# Patient Record
Sex: FEMALE | Race: WHITE | Employment: FULL TIME | URBAN - METROPOLITAN AREA
[De-identification: names, ages, dates, MRNs, and addresses within clinical notes are randomized per-mention and may not be internally consistent; named-entity substitution may affect disease eponyms.]

---

## 2021-02-09 ENCOUNTER — OFFICE VISIT (OUTPATIENT)
Dept: URGENT CARE | Facility: PHYSICIAN GROUP | Age: 66
End: 2021-02-09
Payer: MEDICARE

## 2021-02-09 VITALS
WEIGHT: 134 LBS | TEMPERATURE: 98 F | HEIGHT: 60 IN | BODY MASS INDEX: 26.31 KG/M2 | SYSTOLIC BLOOD PRESSURE: 144 MMHG | DIASTOLIC BLOOD PRESSURE: 80 MMHG | HEART RATE: 75 BPM | RESPIRATION RATE: 14 BRPM | OXYGEN SATURATION: 96 %

## 2021-02-09 DIAGNOSIS — L30.9 PERIORBITAL DERMATITIS: ICD-10-CM

## 2021-02-09 PROCEDURE — 99214 OFFICE O/P EST MOD 30 MIN: CPT | Performed by: NURSE PRACTITIONER

## 2021-02-09 RX ORDER — IBUPROFEN 800 MG/1
800 TABLET ORAL EVERY 8 HOURS PRN
COMMUNITY

## 2021-02-09 RX ORDER — METHOCARBAMOL 500 MG/1
1000 TABLET, FILM COATED ORAL 4 TIMES DAILY
COMMUNITY

## 2021-02-09 RX ORDER — METHYLPREDNISOLONE 4 MG/1
TABLET ORAL
Qty: 21 TAB | Refills: 0 | Status: SHIPPED | OUTPATIENT
Start: 2021-02-09

## 2021-02-09 RX ORDER — AMITRIPTYLINE HYDROCHLORIDE 25 MG/1
25 TABLET, FILM COATED ORAL NIGHTLY
COMMUNITY

## 2021-02-09 NOTE — LETTER
February 9, 2021       Patient: Angelina Meléndez   YOB: 1955   Date of Visit: 2/9/2021         To Whom It May Concern:    In my medical opinion, I recommend that Angelina Meléndez {Work release (duty restriction):49270}    If you have any questions or concerns, please don't hesitate to call [phone]          Sincerely,          Opal Matson, A.P.N.  Electronically Signed

## 2021-02-10 ASSESSMENT — ENCOUNTER SYMPTOMS
MYALGIAS: 0
PHOTOPHOBIA: 0
FEVER: 0
EYE REDNESS: 0
SORE THROAT: 0
VOMITING: 0
NAUSEA: 0
COUGH: 0
HEADACHES: 0
SPUTUM PRODUCTION: 0
BLURRED VISION: 0
EYE PAIN: 0
DOUBLE VISION: 0
EYE DISCHARGE: 0

## 2021-02-10 ASSESSMENT — LIFESTYLE VARIABLES: SUBSTANCE_ABUSE: 0

## 2021-02-10 NOTE — PROGRESS NOTES
"Angelina Meléndez is a 65 y.o. female who presents for Eye Problem (sinus issues, pressure, red puffy eyes itchiness, drainage,  )      HPI this new problem.  Angelina is a 65-year-old female presents with eye itchiness facial redness that started 1 week ago.  She believes she is having allergic reaction to \"something\".  The redness all started with itchy eyes.  She rubs them.  The skin on her face is red and very dry. It feels 'tight' to her.  She has had mild drainage in her eyes in the morning but nothing during the day.  She is itching on her face all the time.  Initially this started after she was working out in her yard.  No other aggravating alleviating factors.  She has taken over-the-counter Benadryl without significant relief in her symptoms.  She denies shortness of breath or difficulty breathing.  Her vision is reported as normal. Treatment tried: Cocoa butter cream and Vaseline BID to TID for 5-7 days now. \" that's what I always use\".     Review of Systems   Constitutional: Negative for fever and malaise/fatigue.   HENT: Negative for congestion and sore throat.    Eyes: Negative for blurred vision, double vision, photophobia, pain, discharge and redness.   Respiratory: Negative for cough and sputum production.    Cardiovascular: Negative for chest pain.   Gastrointestinal: Negative for nausea and vomiting.   Musculoskeletal: Negative for myalgias.   Skin: Positive for itching and rash.   Neurological: Negative for headaches.   Endo/Heme/Allergies: Negative for environmental allergies.   Psychiatric/Behavioral: Negative for substance abuse.       Allergies   Allergen Reactions   • Codeine      Sweats and crazy dreams     Past Medical History:   Diagnosis Date   • LBP (low back pain)      History reviewed. No pertinent surgical history.  Family History   Problem Relation Age of Onset   • Hypertension Father    • Arthritis Father         gout     Social History     Tobacco Use   • Smoking status: " Current Every Day Smoker   • Smokeless tobacco: Never Used   • Tobacco comment: quit last wks   Substance Use Topics   • Alcohol use: No     Patient Active Problem List   Diagnosis   • Low back pain     Current Outpatient Medications on File Prior to Visit   Medication Sig Dispense Refill   • methocarbamol (ROBAXIN) 500 MG Tab Take 1,000 mg by mouth 4 times a day.     • PANTOPRAZOLE SODIUM PO Take  by mouth.     • amitriptyline (ELAVIL) 25 MG Tab Take 25 mg by mouth every evening.     • ibuprofen (MOTRIN) 800 MG Tab Take 800 mg by mouth every 8 hours as needed.     • BEE POLLEN PO Take  by mouth.     • GLUCOSAMINE CHONDROITIN COMPLX PO Take  by mouth.     • VITAMIN C, CALCIUM ASCORBATE, PO Take  by mouth.     • VITAMIN D PO Take  by mouth.     • Multiple Vitamin (MULTIVITAMIN PO) Take  by mouth.     • meloxicam (MOBIC) 15 MG tablet Take 1 Tab by mouth every day. (Patient not taking: Reported on 2/9/2021) 30 Tab 0   • busPIRone (BUSPAR) 15 MG tablet Take 0.5 Tabs by mouth 2 times a day. (Patient not taking: Reported on 2/9/2021) 30 Tab 1     No current facility-administered medications on file prior to visit.          Objective:     /80   Pulse 75   Temp 36.7 °C (98 °F)   Resp 14   Ht 1.524 m (5')   Wt 60.8 kg (134 lb)   SpO2 96%   BMI 26.17 kg/m²     Physical Exam  Vitals and nursing note reviewed.   Constitutional:       General: She is not in acute distress.     Appearance: Normal appearance. She is normal weight. She is not toxic-appearing.   HENT:      Head: Normocephalic.        Mouth/Throat:      Lips: Pink.      Mouth: Mucous membranes are moist.      Pharynx: Oropharynx is clear. Uvula midline. No uvula swelling.   Cardiovascular:      Rate and Rhythm: Normal rate and regular rhythm.      Pulses: Normal pulses.      Heart sounds: Normal heart sounds.   Pulmonary:      Effort: Pulmonary effort is normal.      Breath sounds: Normal breath sounds.   Musculoskeletal:      Cervical back: Full passive  range of motion without pain, normal range of motion and neck supple.   Skin:     General: Skin is warm.      Capillary Refill: Capillary refill takes less than 2 seconds.      Findings: Erythema present.   Neurological:      Mental Status: She is alert and oriented to person, place, and time.   Psychiatric:         Mood and Affect: Mood normal.         Behavior: Behavior normal.         Thought Content: Thought content normal.           Assessment /Associated Orders:      1. Periorbital dermatitis  methylPREDNISolone (MEDROL DOSEPAK) 4 MG Tablet Therapy Pack       Medical Decision Making:      VSS at today's acute urgent care visit.   Educated in proper administration of medication(s) ordered today including safety, possible SE, risks, benefits, rationale and alternatives to therapy.   OTC antihistamine of choice. Follow manufactures dosing and safety guidelines.   Keep well hydrated  Do not scratch or rub at area.   Stop using cocoa butter or Vaseline on face. Use a cream that is made for the eye area.         Discussed management options (risks,benefits, and alternatives to treatment). Pt expresses understanding and the treatment plan was agreed upon. Questions were encouraged and answered to pt's satisfaction.   Advised to follow-up with the primary care physician for recheck, reevaluation, and consideration of further management if necessary.   Return to urgent care prn if new or worsening sx or if there is no improvement in condition prn. Red flags discussed and indications to immediately call 911 or present to the Emergency Department.     I personally reviewed prior external notes and test results pertinent to today's visit.  I have independently reviewed and interpreted all diagnostics ordered during this urgent care acute visit.   Time spent evaluating this patient was at least 30 minutes and includes preparing for visit, counseling/education, exam and evaluation, obtaining history, independent  interpretation and ordering lab/test/procedures/medication management. This does not include time spent on separately billable procedures/tests.      Please note that this dictation was created using voice recognition software. I have made a reasonable attempt to correct obvious errors, but I expect that there are errors of grammar and possibly content that I did not discover before finalizing the note.    This note was electronically signed by Opal CHANCE, Urgent Care

## 2021-02-10 NOTE — PATIENT INSTRUCTIONS
Over the counter antihistamine such as CLARITIN ( loratadine 10 mg)  Take everyday until you are feeling better    Start the prescription steriod tomorrow morning    Take the diphenhydramine ( that you already have ) at night before bedtime if you are itching badly     Use an eye cream ( not Vaseline or Coconut butter) around eyes. It should be specifically made for eyes.           Rash, Adult  A rash is a change in the color of your skin. A rash can also change the way your skin feels. There are many different conditions and factors that can cause a rash. Some rashes may disappear after a few days, but some may last for a few weeks. Common causes of rashes include:  · Viral infections, such as:  ? Colds.  ? Measles.  ? Hand, foot, and mouth disease.  · Bacterial infections, such as:  ? Scarlet fever.  ? Impetigo.  · Fungal infections, such as Candida.  · Allergic reactions to food, medicines, or skin care products.  Follow these instructions at home:  The goal of treatment is to stop the itching and keep the rash from spreading. Pay attention to any changes in your symptoms. Follow these instructions to help with your condition:  Medicine  Take or apply over-the-counter and prescription medicines only as told by your health care provider. These may include:  · Corticosteroid creams to treat red or swollen skin.  · Anti-itch lotions.  · Oral allergy medicines (antihistamines).  · Oral corticosteroids for severe symptoms.    Skin care  · Apply cool compresses to the affected areas.  · Do not scratch or rub your skin.  · Avoid covering the rash. Make sure the rash is exposed to air as much as possible.  Managing itching and discomfort  · Avoid hot showers or baths, which can make itching worse. A cold shower may help.  · Try taking a bath with:  ? Epsom salts. Follow  instructions on the packaging. You can get these at your local pharmacy or grocery store.  ? Baking soda. Pour a small amount into the bath  as told by your health care provider.  ? Colloidal oatmeal. Follow  instructions on the packaging. You can get this at your local pharmacy or grocery store.  · Try applying baking soda paste to your skin. Stir water into baking soda until it reaches a paste-like consistency.  · Try applying calamine lotion. This is an over-the-counter lotion that helps to relieve itchiness.  · Keep cool and out of the sun. Sweating and being hot can make itching worse.  General instructions    · Rest as needed.  · Drink enough fluid to keep your urine pale yellow.  · Wear loose-fitting clothing.  · Avoid scented soaps, detergents, and perfumes. Use gentle soaps, detergents, perfumes, and other cosmetic products.  · Avoid any substance that causes your rash. Keep a journal to help track what causes your rash. Write down:  ? What you eat.  ? What cosmetic products you use.  ? What you drink.  ? What you wear. This includes jewelry.  · Keep all follow-up visits as told by your health care provider. This is important.  Contact a health care provider if:  · You sweat at night.  · You lose weight.  · You urinate more than normal.  · You urinate less than normal, or you notice that your urine is a darker color than usual.  · You feel weak.  · You vomit.  · Your skin or the whites of your eyes look yellow (jaundice).  · Your skin:  ? Tingles.  ? Is numb.  · Your rash:  ? Does not go away after several days.  ? Gets worse.  · You are:  ? Unusually thirsty.  ? More tired than normal.  · You have:  ? New symptoms.  ? Pain in your abdomen.  ? A fever.  ? Diarrhea.  Get help right away if you:  · Have a fever and your symptoms suddenly get worse.  · Develop confusion.  · Have a severe headache or a stiff neck.  · Have severe joint pains or stiffness.  · Have a seizure.  · Develop a rash that covers all or most of your body. The rash may or may not be painful.  · Develop blisters that:  ? Are on top of the rash.  ? Grow larger or grow  "together.  ? Are painful.  ? Are inside your nose or mouth.  · Develop a rash that:  ? Looks like purple pinprick-sized spots all over your body.  ? Has a \"bull's eye\" or looks like a target.  ? Is not related to sun exposure, is red and painful, and causes your skin to peel.  Summary  · A rash is a change in the color of your skin. Some rashes disappear after a few days, but some may last for a few weeks.  · The goal of treatment is to stop the itching and keep the rash from spreading.  · Take or apply over-the-counter and prescription medicines only as told by your health care provider.  · Contact a health care provider if you have new or worsening symptoms.  · Keep all follow-up visits as told by your health care provider. This is important.  This information is not intended to replace advice given to you by your health care provider. Make sure you discuss any questions you have with your health care provider.  Document Released: 12/08/2003 Document Revised: 04/10/2020 Document Reviewed: 07/22/2019  Elsevier Patient Education © 2020 [employer]      "

## 2021-02-23 ENCOUNTER — TELEPHONE (OUTPATIENT)
Dept: URGENT CARE | Facility: PHYSICIAN GROUP | Age: 66
End: 2021-02-23

## 2021-02-23 DIAGNOSIS — L30.9 FACIAL DERMATITIS: ICD-10-CM

## 2021-02-23 DIAGNOSIS — R21 RASH, SKIN: ICD-10-CM

## 2021-02-23 NOTE — TELEPHONE ENCOUNTER
Patient states she saw Opal Matson in  and was given a prescription for her skin problems on her face. Patient says it work for some of the areas on her face but now the rash in moving down to her chin and she would like another refill or a call from provider to tell her if she needs to come back in to be re-evaluated or referral to see a dermatologist. She hopes for a call from provider by 1:00 today as she goes to work after that      Referral placed to dermatology for evaluation and treatment   I tried to call pt. Someone answered and then hung up. .

## 2021-03-03 DIAGNOSIS — Z23 NEED FOR VACCINATION: ICD-10-CM
